# Patient Record
Sex: MALE | Race: WHITE
[De-identification: names, ages, dates, MRNs, and addresses within clinical notes are randomized per-mention and may not be internally consistent; named-entity substitution may affect disease eponyms.]

---

## 2020-07-16 ENCOUNTER — HOSPITAL ENCOUNTER (EMERGENCY)
Dept: HOSPITAL 41 - JD.ED | Age: 25
Discharge: HOME | End: 2020-07-16
Payer: SELF-PAY
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
## 2020-07-16 DIAGNOSIS — S61.313A: Primary | ICD-10-CM

## 2020-07-16 PROCEDURE — 99283 EMERGENCY DEPT VISIT LOW MDM: CPT

## 2020-07-16 PROCEDURE — 12001 RPR S/N/AX/GEN/TRNK 2.5CM/<: CPT

## 2020-07-16 PROCEDURE — 73140 X-RAY EXAM OF FINGER(S): CPT

## 2020-07-16 NOTE — CR
Left third finger: 4 views of the left third finger were obtained.

 

Fracture is noted within the tuft of the distal third finger.  

Fracture fragments are displaced.  Soft tissue swelling is noted.  No 

additional abnormality is seen.

 

Impression:

1.  Displaced tuft fracture within the distal left third finger.  Soft

 tissue swelling.

 

Diagnostic code #3

 

This report was dictated in MDT

## 2020-07-16 NOTE — EDM.PDOC
ED HPI GENERAL MEDICAL PROBLEM





- General


Chief Complaint: Upper Extremity Injury/Pain


Stated Complaint: L MIDDLE FINGER INJURY


Time Seen by Provider: 07/16/20 09:09


Source of Information: Reports: Patient, RN Notes Reviewed





- History of Present Illness


INITIAL COMMENTS - FREE TEXT/NARRATIVE: 





24 yr old male had heavy piece of iron fall unto distal L middle finger getting 

ready for work.  He has pain, swelliing, mild bleeding.  No other injury.  Last 

DT about 4 to 5 yrs ago. 


  ** Left Finger-Middle


Pain Score (Numeric/FACES): 5





- Related Data


                                    Allergies











Allergy/AdvReac Type Severity Reaction Status Date / Time


 


No Known Allergies Allergy   Verified 07/16/20 08:56











Home Meds: 


                                    Home Meds





Acetaminophen/HYDROcodone [Norco 325-5 MG] 1 tab PO Q6H PRN #14 tablet 07/16/20 

[Rx]


cephALEXin [Cephalexin] 500 mg PO QID #30 capsule 07/16/20 [Rx]











Social & Family History





- Tobacco Use


Smoking Status *Q: Never Smoker





- Caffeine Use


Caffeine Use: Reports: Energy Drinks





- Recreational Drug Use


Recreational Drug Use: No





Review of Systems





- Review of Systems


Review Of Systems: See Below


Constitutional: Reports: No Symptoms


Mouth/Throat: Reports: No Symptoms


Respiratory: Denies: Shortness of Breath


Cardiovascular: Denies: Chest Pain


GI/Abdominal: Denies: Nausea, Vomiting


Musculoskeletal: Reports: Other (pain, swelling distal L middle finger)


Skin: Reports: Other (small superfiscial lac injury distal finger)





ED EXAM, GENERAL





- Physical Exam


Exam: See Below


General Appearance: Alert, Mild Distress


Head: Atraumatic


Neck: Supple


Respiratory/Chest: No Respiratory Distress


Extremities: Other (there is diffuse swelling and tenderness of distal phalanx, 

there is partial nailbed avulsion, with short 0.75 cm longitudinal lac dorsal f

inger proximal )





ED TRAUMA EXTREMITY PROCEDURES





- Laceration/Wound Repair


  ** Left Distal Digit - 3rd (Middle)


Lac/Wound Length In cm: 0.7


Appearance: Linear, Irregular


Distal NVT: Neuro & Vascular Intact


Anesthetic Type: Digital


Local Anesthesia - Lidocaine (Xylocaine): 1% Plain


Skin Prep: Chlorhexidine (Hibiciens), Saline


Suture Size: 3-0


# of Sutures: 3


Suture Type: Nylon





Course





- Vital Signs


Last Recorded V/S: 


                                Last Vital Signs











Temp  98.0 F   07/16/20 08:53


 


Pulse  56 L  07/16/20 08:53


 


Resp  16   07/16/20 08:53


 


BP  138/86   07/16/20 08:53


 


Pulse Ox  100   07/16/20 08:53














- Orders/Labs/Meds


Meds: 


Medications














Discontinued Medications














Generic Name Dose Route Start Last Admin





  Trade Name Mananq  PRN Reason Stop Dose Admin


 


Cephalexin  500 mg  07/16/20 09:45  07/16/20 09:50





  Keflex  PO  07/16/20 09:46  500 mg





  ONETIME ONE   Administration


 


Lidocaine HCl  50 ml  07/16/20 09:44  07/16/20 09:50





  Xylocaine 1%  INJECT  07/16/20 09:45  50 ml





  ONETIME ONE   Administration


 


Oxycodone/Acetaminophen  1 tab  07/16/20 09:14  07/16/20 09:18





  Percocet 325-5 Mg  PO  07/16/20 09:15  1 tab





  ONETIME ONE   Administration














Departure





- Departure


Time of Disposition: 23:00


Disposition: Home, Self-Care 01


Condition: Fair


Clinical Impression: 


Fingertip contusion


Qualifiers:


 Encounter type: initial encounter Qualified Code(s): S60.00XA - Contusion of 

unspecified finger without damage to nail, initial encounter





Finger laceration


Qualifiers:


 Encounter type: initial encounter Finger: middle finger Damage to nail status: 

with damage Foreign body presence: without foreign body Laterality: left 

Qualified Code(s): S61.313A - Laceration without foreign body of left middle 

finger with damage to nail, initial encounter





Fracture, finger, distal phalanx


Qualifiers:


 Encounter type: initial encounter Finger: middle finger Fracture type: open 

Fracture alignment: displaced Laterality: left Qualified Code(s): S62.633B - 

Displaced fracture of distal phalanx of left middle finger, initial encounter 

for open fracture








- Discharge Information


Prescriptions: 


cephALEXin [Cephalexin] 500 mg PO QID #30 capsule


Acetaminophen/HYDROcodone [Norco 325-5 MG] 1 tab PO Q6H PRN #14 tablet


 PRN Reason: Pain


Instructions:  Finger Fracture, Adult, Easy-to-Read, Contusion, Easy-to-Read, 

Laceration Care, Adult, Easy-to-Read


Referrals: 


PCP,None [Primary Care Provider] - 


Forms:  ED Department Discharge


Additional Instructions: 


Tube gauze dressing.  Leave that on until Sunday, after that continue to protect

with bandaids, guaze dressing for extra protection as needed.  Keep as dry and 

clean as possible.  Tyelnol alternating with ibuprofen or aleve as needed for 

pain.  Elevate as much as possible.  Hydrocodone if needed for severe pain.  Do 

not drive or work when taking hydrocodone.  Do not drive and take hydrocodone at

the same time. Stitches out in about 10 days.  They can be taken out at our [facility] 

medical clinic.  Call [phone] for appt. 





Sepsis Event Note (ED)





- Evaluation


Sepsis Screening Result: No Definite Risk





- Focused Exam


Vital Signs: 


                                   Vital Signs











  Temp Pulse Resp BP Pulse Ox


 


 07/16/20 08:53  98.0 F  56 L  16  138/86  100